# Patient Record
Sex: FEMALE | Race: WHITE | Employment: OTHER | URBAN - METROPOLITAN AREA
[De-identification: names, ages, dates, MRNs, and addresses within clinical notes are randomized per-mention and may not be internally consistent; named-entity substitution may affect disease eponyms.]

---

## 2023-12-16 ENCOUNTER — APPOINTMENT (OUTPATIENT)
Dept: GENERAL RADIOLOGY | Age: 83
End: 2023-12-16
Payer: MEDICARE

## 2023-12-16 ENCOUNTER — HOSPITAL ENCOUNTER (EMERGENCY)
Age: 83
Discharge: HOME | End: 2023-12-16
Attending: STUDENT IN AN ORGANIZED HEALTH CARE EDUCATION/TRAINING PROGRAM
Payer: MEDICARE

## 2023-12-16 VITALS
OXYGEN SATURATION: 97 % | BODY MASS INDEX: 24.92 KG/M2 | DIASTOLIC BLOOD PRESSURE: 98 MMHG | SYSTOLIC BLOOD PRESSURE: 156 MMHG | HEIGHT: 61 IN | RESPIRATION RATE: 16 BRPM | TEMPERATURE: 97.7 F | HEART RATE: 94 BPM | WEIGHT: 132 LBS

## 2023-12-16 DIAGNOSIS — R22.9 SKIN NODULE: Primary | ICD-10-CM

## 2023-12-16 PROCEDURE — 73590 X-RAY EXAM OF LOWER LEG: CPT

## 2023-12-16 NOTE — ED PROVIDER NOTES
[address] ENCOUNTER        Pt Name: Rollene Brunner  MRN: [id number]  [address] [facility] [place] 1940  Date of evaluation: 12/16/2023  Provider: John Jack DO  PCP: Shannan Pedroza MD  Note Started: 7:27 AM EST 12/16/23    CHIEF COMPLAINT       Chief Complaint   Patient presents with    Mass     Lump on left lower leg. Noticed yesterday. Denies pain. HISTORY OF PRESENT ILLNESS: 1 or more Elements     Limitations to history : None    Elaine Monson is a 80 y.o. female who presents to the emergency department for evaluation of \"lump\" on her anterior left lower leg that she noticed yesterday. It is at the midportion of her anterior shin, slightly medial; she states that it is not painful at all, she denies any injury. She does go to physical therapy and states that it is pretty strenuous but denies any injuries. She has not had any leg swelling, numbness or weakness anywhere, calf pain redness or swelling. She denies any warmth or overlying skin changes. Denies any swelling to the area, but states that if you touch the area you can tell that there is a lump there. She states she has never had this before. Of note, she was seen for podiatry this past week for localized welling of her left ankle which she states is completely resolved. Nursing Notes were all reviewed and agreed with or any disagreements were addressed in the HPI. REVIEW OF EXTERNAL NOTE :       Podiatry visit, outpatient on 12/14/2023 for evaluation of localized edema to her left ankle. She was prescribed an Unna boot compression wrap. Chart Review/External Note Review    Last Echo reviewed by Me:  No results found for: \"LVEF\", \"LVEFMODE\"        Controlled Substance Monitoring:    Acute and Chronic Pain Monitoring:        No data to display                      REVIEW OF SYSTEMS :      Review of Systems   Constitutional:  Negative for chills and fever. 8:58 AM      NOTE: This report was transcribed using voice recognition software.  Every effort was made to ensure accuracy; however, inadvertent computerized transcription errors may be present           Ilda Sierra DO  12/16/23 [id number]

## 2023-12-16 NOTE — DISCHARGE INSTRUCTIONS
Please return to the ER for any new or worsening symptoms including but not limited to Fever, Chest pain or difficulty breathing, or Numbness or weakness anywhere  If prescribed, please be sure to  your prescriptions from the pharmacy  Please follow-up with Primary care provider as instructed    Xray left tibia/fibula, 12/16/2023, RESULTS:    IMPRESSION:  No acute osseous findings of the left tibia or fibula specifically no acute fracture or cortical irregularity.   If there is further concern MRI may be useful in the setting of stress injury consideration or soft tissue findings